# Patient Record
Sex: MALE | Race: WHITE | Employment: OTHER | URBAN - METROPOLITAN AREA
[De-identification: names, ages, dates, MRNs, and addresses within clinical notes are randomized per-mention and may not be internally consistent; named-entity substitution may affect disease eponyms.]

---

## 2020-07-06 ENCOUNTER — HOSPITAL ENCOUNTER (EMERGENCY)
Age: 60
Discharge: HOME | End: 2020-07-06
Attending: STUDENT IN AN ORGANIZED HEALTH CARE EDUCATION/TRAINING PROGRAM | Admitting: STUDENT IN AN ORGANIZED HEALTH CARE EDUCATION/TRAINING PROGRAM
Payer: COMMERCIAL

## 2020-07-06 ENCOUNTER — APPOINTMENT (OUTPATIENT)
Dept: GENERAL RADIOLOGY | Age: 60
End: 2020-07-06
Attending: STUDENT IN AN ORGANIZED HEALTH CARE EDUCATION/TRAINING PROGRAM
Payer: COMMERCIAL

## 2020-07-06 VITALS
DIASTOLIC BLOOD PRESSURE: 78 MMHG | RESPIRATION RATE: 18 BRPM | TEMPERATURE: 98.1 F | BODY MASS INDEX: 31.61 KG/M2 | OXYGEN SATURATION: 94 % | SYSTOLIC BLOOD PRESSURE: 120 MMHG | HEIGHT: 73 IN | HEART RATE: 85 BPM | WEIGHT: 238.54 LBS

## 2020-07-06 DIAGNOSIS — S61.310A LACERATION OF RIGHT INDEX FINGER WITHOUT FOREIGN BODY WITH DAMAGE TO NAIL, INITIAL ENCOUNTER: Primary | ICD-10-CM

## 2020-07-06 DIAGNOSIS — S60.10XA SUBUNGUAL HEMATOMA OF FINGER, INITIAL ENCOUNTER: ICD-10-CM

## 2020-07-06 PROCEDURE — 99284 EMERGENCY DEPT VISIT MOD MDM: CPT

## 2020-07-06 PROCEDURE — 90471 IMMUNIZATION ADMIN: CPT

## 2020-07-06 PROCEDURE — 75810000465 HC EVAC SUBUNGUAL HEMATOMA LVL 4 5734

## 2020-07-06 PROCEDURE — 73140 X-RAY EXAM OF FINGER(S): CPT

## 2020-07-06 PROCEDURE — 75810000293 HC SIMP/SUPERF WND  RPR

## 2020-07-06 PROCEDURE — 90715 TDAP VACCINE 7 YRS/> IM: CPT | Performed by: STUDENT IN AN ORGANIZED HEALTH CARE EDUCATION/TRAINING PROGRAM

## 2020-07-06 PROCEDURE — 74011250636 HC RX REV CODE- 250/636: Performed by: STUDENT IN AN ORGANIZED HEALTH CARE EDUCATION/TRAINING PROGRAM

## 2020-07-06 PROCEDURE — 74011000250 HC RX REV CODE- 250: Performed by: STUDENT IN AN ORGANIZED HEALTH CARE EDUCATION/TRAINING PROGRAM

## 2020-07-06 RX ORDER — CEPHALEXIN 500 MG/1
500 CAPSULE ORAL 4 TIMES DAILY
Qty: 28 CAP | Refills: 0 | Status: SHIPPED | OUTPATIENT
Start: 2020-07-06 | End: 2020-07-13

## 2020-07-06 RX ORDER — LIDOCAINE HYDROCHLORIDE 10 MG/ML
5 INJECTION, SOLUTION EPIDURAL; INFILTRATION; INTRACAUDAL; PERINEURAL ONCE
Status: COMPLETED | OUTPATIENT
Start: 2020-07-06 | End: 2020-07-06

## 2020-07-06 RX ADMIN — LIDOCAINE HYDROCHLORIDE 5 ML: 10 INJECTION, SOLUTION EPIDURAL; INFILTRATION; INTRACAUDAL; PERINEURAL at 15:27

## 2020-07-06 RX ADMIN — TETANUS TOXOID, REDUCED DIPHTHERIA TOXOID AND ACELLULAR PERTUSSIS VACCINE, ADSORBED 0.5 ML: 5; 2.5; 8; 8; 2.5 SUSPENSION INTRAMUSCULAR at 16:54

## 2020-07-06 NOTE — DISCHARGE INSTRUCTIONS
PLEASE KEEP A CLOSE EYE ON YOUR WOUND(S). IF YOU NOTICE RED STREAKING, INCREASING DRAINAGE, WORSENING REDNESS, OR FEVER THEN PLEASE RETURN IMMEDIATELY.      Stitches out in 7 days

## 2020-07-06 NOTE — ED NOTES
Dressing applied. The patient was discharged home by Dr Brandon Coronado in stable condition. The patient is alert and oriented, in no respiratory distress and discharge vital signs obtained. The patient's diagnosis, condition and treatment were explained. The patient expressed understanding. One prescription was given. No work/school note given. A discharge plan has been developed. A  was not involved in the process. Aftercare instructions were given. Pt ambulatory out of the ED.

## 2020-07-06 NOTE — ED PROVIDER NOTES
21-year-old male here with crush injury to the right index finger. A 200 pound river rock fell on his hand while he was working outside. Sustained a laceration to the distal aspect of the right index finger as well as a subungual hematoma. Bleeding controlled with pressure. Pain severe, worse with palpation. No numbness. No wrist, forearm or elbow injury. Unsure of last tetanus shot. History reviewed. No pertinent past medical history. Past Surgical History:   Procedure Laterality Date    HX ORTHOPAEDIC      left knee         History reviewed. No pertinent family history.     Social History     Socioeconomic History    Marital status:      Spouse name: Not on file    Number of children: Not on file    Years of education: Not on file    Highest education level: Not on file   Occupational History    Not on file   Social Needs    Financial resource strain: Not on file    Food insecurity     Worry: Not on file     Inability: Not on file    Transportation needs     Medical: Not on file     Non-medical: Not on file   Tobacco Use    Smoking status: Never Smoker    Smokeless tobacco: Current User   Substance and Sexual Activity    Alcohol use: Yes     Comment: socially    Drug use: Never    Sexual activity: Not on file   Lifestyle    Physical activity     Days per week: Not on file     Minutes per session: Not on file    Stress: Not on file   Relationships    Social connections     Talks on phone: Not on file     Gets together: Not on file     Attends Anabaptism service: Not on file     Active member of club or organization: Not on file     Attends meetings of clubs or organizations: Not on file     Relationship status: Not on file    Intimate partner violence     Fear of current or ex partner: Not on file     Emotionally abused: Not on file     Physically abused: Not on file     Forced sexual activity: Not on file   Other Topics Concern    Not on file   Social History Narrative  Not on file         ALLERGIES: Amoxicillin    Review of Systems   Musculoskeletal: Positive for arthralgias. Skin: Positive for wound. Hematological: Does not bruise/bleed easily. Vitals:    07/06/20 1600 07/06/20 1615 07/06/20 1630 07/06/20 1645   BP: 124/78 122/77 132/69 120/78   Pulse:       Resp:       Temp:       SpO2: 93% 94% 95% 94%   Weight:       Height:                Physical Exam  Constitutional:       General: He is not in acute distress. Appearance: He is well-developed. He is not ill-appearing. HENT:      Head: Normocephalic. Eyes:      Conjunctiva/sclera: Conjunctivae normal.   Neck:      Musculoskeletal: Normal range of motion. Pulmonary:      Effort: Pulmonary effort is normal. No respiratory distress. Musculoskeletal: Normal range of motion. Comments: Right upper extremity: Strong radial pulse. There is a 1 cm laceration to the distal palmar aspect of the index finger which is jagged. Strength intact PIP and DIP joints. There is complete subungual hematoma to the index finger. Capillary refill intact distally. Sensation intact distally. Skin:     General: Skin is warm and dry. Capillary Refill: Capillary refill takes less than 2 seconds. Neurological:      Mental Status: He is alert and oriented to person, place, and time. Psychiatric:         Behavior: Behavior normal.            Imaging Reviewed:   Chest x-ray negative for acute process      Course: Tdap updated      Procedure Note - LACERATION SUTURE:    Wound Location:R index finger  Wound Size: 1cm  Debridement: No  Nerve Involvement: No  Tendon Involvement: No  Foreign Bodies Found:  None    Anesthetized with 5ml lidocaine 1% ring block  Wound irrigated using 500cc of saline under high pressure + betadine. Wound explored for foreign bodies and none found. Wound edges reapproximated and closed using 5-0 nylon sutures.      Technique: Simple Interrupted  Number of sutures: 4    Procedure was well tolerated with no complications. Clean dressing placed. Procedure Note - Nail Trephination:    Location:R index finger  100% subungual hematoma    Anesthetized with 5ml lidocaine 1% ring block  2-3mm hole created with electrocautery with +blood return and clearing of hematoma    Procedure was well tolerated with no complications. Clean dressing placed. MDM:  67-year-old male here with injury to his right index finger after a heavy rock fell on it. Laceration repaired as above. He had large subungual hematoma which was drained as above. No evidence of fracture. Given this was a crush injury I did treat him with antibiotics. Tdap updated. Neurovascular intact before and after procedures. Patient to be discharged home with strict return precautions regarding wound care and extremity injuries. Clinical Impression:     ICD-10-CM ICD-9-CM    1. Laceration of right index finger without foreign body with damage to nail, initial encounter S61.310A 883.0    2.  Subungual hematoma of finger, initial encounter S60.10XA 923.3            Disposition: RAMSEY Lazo DO